# Patient Record
Sex: FEMALE | Race: OTHER | HISPANIC OR LATINO | URBAN - METROPOLITAN AREA
[De-identification: names, ages, dates, MRNs, and addresses within clinical notes are randomized per-mention and may not be internally consistent; named-entity substitution may affect disease eponyms.]

---

## 2024-02-03 ENCOUNTER — EMERGENCY (EMERGENCY)
Facility: HOSPITAL | Age: 44
LOS: 1 days | Discharge: HOME | End: 2024-02-03
Attending: PERSONAL EMERGENCY RESPONSE ATTENDANT
Payer: MEDICAID

## 2024-02-03 VITALS
WEIGHT: 160.06 LBS | DIASTOLIC BLOOD PRESSURE: 111 MMHG | OXYGEN SATURATION: 100 % | RESPIRATION RATE: 20 BRPM | SYSTOLIC BLOOD PRESSURE: 158 MMHG | HEART RATE: 65 BPM | HEIGHT: 55 IN | TEMPERATURE: 98 F

## 2024-02-03 DIAGNOSIS — Z98.891 HISTORY OF UTERINE SCAR FROM PREVIOUS SURGERY: Chronic | ICD-10-CM

## 2024-02-03 LAB
ALBUMIN SERPL ELPH-MCNC: 4.4 G/DL — SIGNIFICANT CHANGE UP (ref 3.3–5)
ALP SERPL-CCNC: 103 U/L — SIGNIFICANT CHANGE UP (ref 40–120)
ALT FLD-CCNC: 20 U/L — SIGNIFICANT CHANGE UP (ref 10–45)
ANION GAP SERPL CALC-SCNC: 12 MMOL/L — SIGNIFICANT CHANGE UP (ref 5–17)
AST SERPL-CCNC: 15 U/L — SIGNIFICANT CHANGE UP (ref 10–40)
BASE EXCESS BLDV CALC-SCNC: -0.4 MMOL/L — SIGNIFICANT CHANGE UP (ref -2–3)
BASOPHILS # BLD AUTO: 0.04 K/UL — SIGNIFICANT CHANGE UP (ref 0–0.2)
BASOPHILS NFR BLD AUTO: 0.5 % — SIGNIFICANT CHANGE UP (ref 0–2)
BILIRUB SERPL-MCNC: 0.2 MG/DL — SIGNIFICANT CHANGE UP (ref 0.2–1.2)
BUN SERPL-MCNC: 21 MG/DL — SIGNIFICANT CHANGE UP (ref 7–23)
CA-I SERPL-SCNC: 1.23 MMOL/L — SIGNIFICANT CHANGE UP (ref 1.15–1.33)
CALCIUM SERPL-MCNC: 9 MG/DL — SIGNIFICANT CHANGE UP (ref 8.4–10.5)
CHLORIDE BLDV-SCNC: 104 MMOL/L — SIGNIFICANT CHANGE UP (ref 96–108)
CHLORIDE SERPL-SCNC: 104 MMOL/L — SIGNIFICANT CHANGE UP (ref 96–108)
CO2 BLDV-SCNC: 27 MMOL/L — HIGH (ref 22–26)
CO2 SERPL-SCNC: 22 MMOL/L — SIGNIFICANT CHANGE UP (ref 22–31)
CREAT SERPL-MCNC: 0.71 MG/DL — SIGNIFICANT CHANGE UP (ref 0.5–1.3)
EGFR: 108 ML/MIN/1.73M2 — SIGNIFICANT CHANGE UP
EOSINOPHIL # BLD AUTO: 0.04 K/UL — SIGNIFICANT CHANGE UP (ref 0–0.5)
EOSINOPHIL NFR BLD AUTO: 0.5 % — SIGNIFICANT CHANGE UP (ref 0–6)
GAS PNL BLDV: 136 MMOL/L — SIGNIFICANT CHANGE UP (ref 136–145)
GAS PNL BLDV: SIGNIFICANT CHANGE UP
GLUCOSE BLDV-MCNC: 96 MG/DL — SIGNIFICANT CHANGE UP (ref 70–99)
GLUCOSE SERPL-MCNC: 98 MG/DL — SIGNIFICANT CHANGE UP (ref 70–99)
HCG SERPL-ACNC: <2 MIU/ML — SIGNIFICANT CHANGE UP
HCO3 BLDV-SCNC: 25 MMOL/L — SIGNIFICANT CHANGE UP (ref 22–29)
HCT VFR BLD CALC: 40.6 % — SIGNIFICANT CHANGE UP (ref 34.5–45)
HCT VFR BLDA CALC: 39 % — SIGNIFICANT CHANGE UP (ref 34.5–46.5)
HGB BLD CALC-MCNC: 13 G/DL — SIGNIFICANT CHANGE UP (ref 11.7–16.1)
HGB BLD-MCNC: 12.3 G/DL — SIGNIFICANT CHANGE UP (ref 11.5–15.5)
IMM GRANULOCYTES NFR BLD AUTO: 0.2 % — SIGNIFICANT CHANGE UP (ref 0–0.9)
LACTATE BLDV-MCNC: 0.7 MMOL/L — SIGNIFICANT CHANGE UP (ref 0.5–2)
LYMPHOCYTES # BLD AUTO: 2.08 K/UL — SIGNIFICANT CHANGE UP (ref 1–3.3)
LYMPHOCYTES # BLD AUTO: 25.5 % — SIGNIFICANT CHANGE UP (ref 13–44)
MAGNESIUM SERPL-MCNC: 2.3 MG/DL — SIGNIFICANT CHANGE UP (ref 1.6–2.6)
MCHC RBC-ENTMCNC: 23.3 PG — LOW (ref 27–34)
MCHC RBC-ENTMCNC: 30.3 GM/DL — LOW (ref 32–36)
MCV RBC AUTO: 76.9 FL — LOW (ref 80–100)
MONOCYTES # BLD AUTO: 0.44 K/UL — SIGNIFICANT CHANGE UP (ref 0–0.9)
MONOCYTES NFR BLD AUTO: 5.4 % — SIGNIFICANT CHANGE UP (ref 2–14)
NEUTROPHILS # BLD AUTO: 5.53 K/UL — SIGNIFICANT CHANGE UP (ref 1.8–7.4)
NEUTROPHILS NFR BLD AUTO: 67.9 % — SIGNIFICANT CHANGE UP (ref 43–77)
NRBC # BLD: 0 /100 WBCS — SIGNIFICANT CHANGE UP (ref 0–0)
NT-PROBNP SERPL-SCNC: 138 PG/ML — SIGNIFICANT CHANGE UP (ref 0–300)
PCO2 BLDV: 45 MMHG — HIGH (ref 39–42)
PH BLDV: 7.36 — SIGNIFICANT CHANGE UP (ref 7.32–7.43)
PLATELET # BLD AUTO: 210 K/UL — SIGNIFICANT CHANGE UP (ref 150–400)
PO2 BLDV: 22 MMHG — LOW (ref 25–45)
POTASSIUM BLDV-SCNC: 3.9 MMOL/L — SIGNIFICANT CHANGE UP (ref 3.5–5.1)
POTASSIUM SERPL-MCNC: 3.9 MMOL/L — SIGNIFICANT CHANGE UP (ref 3.5–5.3)
POTASSIUM SERPL-SCNC: 3.9 MMOL/L — SIGNIFICANT CHANGE UP (ref 3.5–5.3)
PROT SERPL-MCNC: 7.7 G/DL — SIGNIFICANT CHANGE UP (ref 6–8.3)
RBC # BLD: 5.28 M/UL — HIGH (ref 3.8–5.2)
RBC # FLD: 15 % — HIGH (ref 10.3–14.5)
SAO2 % BLDV: 30.3 % — LOW (ref 67–88)
SODIUM SERPL-SCNC: 138 MMOL/L — SIGNIFICANT CHANGE UP (ref 135–145)
TROPONIN T, HIGH SENSITIVITY RESULT: <6 NG/L — SIGNIFICANT CHANGE UP (ref 0–51)
TROPONIN T, HIGH SENSITIVITY RESULT: <6 NG/L — SIGNIFICANT CHANGE UP (ref 0–51)
WBC # BLD: 8.15 K/UL — SIGNIFICANT CHANGE UP (ref 3.8–10.5)
WBC # FLD AUTO: 8.15 K/UL — SIGNIFICANT CHANGE UP (ref 3.8–10.5)

## 2024-02-03 PROCEDURE — 99223 1ST HOSP IP/OBS HIGH 75: CPT

## 2024-02-03 PROCEDURE — 71045 X-RAY EXAM CHEST 1 VIEW: CPT | Mod: 26

## 2024-02-03 RX ORDER — ASPIRIN/CALCIUM CARB/MAGNESIUM 324 MG
81 TABLET ORAL DAILY
Refills: 0 | Status: DISCONTINUED | OUTPATIENT
Start: 2024-02-03 | End: 2024-02-07

## 2024-02-03 RX ADMIN — Medication 81 MILLIGRAM(S): at 20:17

## 2024-02-03 NOTE — ED CDU PROVIDER INITIAL DAY NOTE - ATTENDING APP SHARED VISIT CONTRIBUTION OF CARE
Attending MD Alvarez.  Agree with above.  PT is a well appearing, otherwise healthy 44 yo fem with no known hx of HTN/HLD/DM or CAD who presents to ED with complaint of R sided CP/SOB/sharp pain without clear exacerbating/relieving features.  NO LE edema.  No family hx of heart disease at a young age save pt's daughter who had congenital cardiac problem (patent ductus? req prostaglandins).  Pt endorses 2 wks intermittent CP.  Pt with EKG with concern for ST segment depressions in V3-V6 with mild elevation in AVR.  Does not meet STEMI criteria and repeat EKG's do not demonstrate evolution.  No previous EKG available for comparison.  Posterior EKG without STEMI indication.  Planned CDU stay for tele monitoring, echo, stress and cards doc of day.  PT has no current or personal cardiologist yet.  Pt well appearing.  No current CP.

## 2024-02-03 NOTE — ED PROVIDER NOTE - CLINICAL SUMMARY MEDICAL DECISION MAKING FREE TEXT BOX
43 y.o female nonsmoker w/ hx iron-deficiency anemia presents w/ intermittent non-positional, non-exertional right-sided sharp chest pain and pressure x 2 weeks, radiating to right arm and shoulder, associated with shortness of breath and lightheadedness. Improved w/ Tylenol. No famhx CAD. Pt hypertensive to 158/111, vital signs otherwise stable. Physical exam unremarkable. EKG significant for ST depression in precordial leads, T-wave inversions, new compared to previous EKG. Differential diagnosis includes ACS, bundle branch block, PE, CHF, PNA, GERD, cholecystitis, AAA. Given pt's symptoms and EKG changes, will work up for ACS.     Labs: CBC, CMP, Trop x 2, pro-BNP, Mg, VBG  XR Chest  Repeat EKGs    Dispo: Pending labs and imaging, if workup is negative and EKGs do not progress, likely d/c home w/ outpatient cardiology followup 43 y.o female nonsmoker w/ hx iron-deficiency anemia presents w/ intermittent non-positional, non-exertional right-sided sharp chest pain and pressure x 2 weeks, radiating to right arm and shoulder, associated with shortness of breath and lightheadedness. Improved w/ Tylenol. No famhx CAD. Pt hypertensive to 158/111, vital signs otherwise stable. Physical exam unremarkable. EKG significant for ST depression in precordial leads, T-wave inversions, new compared to previous EKG. Differential diagnosis includes ACS, bundle branch block, PE, CHF, PNA, GERD, cholecystitis, AAA. Given pt's symptoms and EKG changes, will work up for ACS.     Labs: CBC, CMP, Trop x 2, pro-BNP, Mg, VBG, HCG quant  XR Chest  Repeat EKGs    Dispo: Pending labs and imaging, if workup is negative and EKGs do not progress, likely d/c home w/ outpatient cardiology followup 43 y.o female nonsmoker w/ hx iron-deficiency anemia presents w/ intermittent non-positional, non-exertional right-sided sharp chest pain and pressure x 2 weeks, now resolved, radiating to right arm and shoulder, associated with shortness of breath and lightheadedness. Improved w/ Tylenol. Pt reports no pain x 3 days. No famhx CAD. Pt hypertensive to 158/111, vital signs otherwise stable. Physical exam unremarkable. EKG significant for ST depression in precordial leads, T-wave inversions, new compared to previous EKG. Differential diagnosis includes ACS, bundle branch block, PE, CHF, PNA, GERD, cholecystitis, AAA. Lower suspicion for ACS at this time as pt has been without pain for 3 days, however, given history and EKG changes, will workup to r/o ACS.    Labs: CBC, CMP, Trop, pro-BNP, Mg, VBG, HCG quant  XR Chest  Repeat EKGs    Dispo: Pending labs and imaging, if workup is negative and EKGs do not progress, likely d/c home w/ outpatient cardiology followup 43 y.o female nonsmoker w/ hx iron-deficiency anemia presents w/ intermittent non-positional, non-exertional right-sided sharp chest pain and pressure x 2 weeks, now resolved, radiating to right arm and shoulder, associated with shortness of breath and lightheadedness. Improved w/ Tylenol. Pt reports no pain x 3 days. No famhx CAD. Pt hypertensive to 158/111, vital signs otherwise stable. Physical exam unremarkable. EKG significant for ST depression in precordial leads, T-wave inversions, new compared to previous EKG. Differential diagnosis includes ACS, bundle branch block, PE, CHF, PNA, GERD, cholecystitis, AAA. Lower suspicion for ACS at this time as pt has been without pain for 3 days, however, given history and EKG changes, will workup to r/o ACS.    Labs: CBC, CMP, Trop, pro-BNP, Mg, VBG, HCG quant  XR Chest  Repeat EKGs    Dispo: Pending labs and imaging, if workup is negative and EKGs do not progress, likely offer overnight observation, then d/c home w/ outpatient cardiology followup 43 y.o female nonsmoker w/ hx iron-deficiency anemia presents w/ intermittent non-positional, non-exertional right-sided sharp chest pain and pressure x 2 weeks, now resolved, radiating to right arm and shoulder, associated with shortness of breath and lightheadedness. Improved w/ Tylenol. Pt reports no pain x 3 days. No famhx CAD. Pt hypertensive to 158/111, vital signs otherwise stable. Physical exam unremarkable. EKG significant for ST depression in precordial leads, T-wave inversions, new compared to previous EKG. Differential diagnosis includes ACS, bundle branch block, PE, CHF, PNA, GERD, cholecystitis, AAA. Lower suspicion for ACS at this time as pt has been without pain for 3 days, however, given history and EKG changes, will workup to r/o ACS.    Labs: CBC, CMP, Trop, pro-BNP, Mg, VBG, HCG quant  XR Chest  Repeat EKGs    Dispo: Pending labs and imaging, if workup is negative and EKGs do not progress, likely offer overnight observation, then d/c home w/ outpatient cardiology followup    Attending MD Alvarez.  Agree with above.  PT is a well appearing, otherwise healthy 42 yo fem with no known hx of HTN/HLD/DM or CAD who presents to ED with complaint of R sided CP/SOB/sharp pain without clear exacerbating/relieving features.  NO LE edema.  No family hx of heart disease at a young age save pt's daughter who had congenital cardiac problem (patent ductus? req prostaglandins).  Pt endorses 2 wks intermittent CP.  Pt with EKG with concern for ST segment depressions in V3-V6 with mild elevation in AVR.  Does not meet STEMI criteria and repeat EKG's do not demonstrate evolution.  No previous EKG available for comparison.  Posterior EKG without STEMI indication.  Planned CDU stay for tele monitoring, echo, stress and cards doc of day.  PT has no current or personal cardiologist yet.  Pt well appearing.  No current CP.

## 2024-02-03 NOTE — ED PROVIDER NOTE - PROGRESS NOTE DETAILS
Updated patient, labs wnl, troponin negative. Pt feels well and is currently without chest pain. Pt agreeable to CDU admission for overnight monitoring, echo, and cardiology consult. CDU PA made aware, is accepting the patient.

## 2024-02-03 NOTE — ED PROVIDER NOTE - OBJECTIVE STATEMENT
43 y.o female nonsmoker w/ hx iron-deficiency anemia presents w/ intermittent right-sided chest pain and pressure x 2 weeks. Pt reports pain presents suddenly, sometime w/ activity and sometimes at rest. Pt describes pain as sharp and pressure, lasting for minutes to hours, primarily over right side of chest, occasionally radiating to right arm and right shoulder, sometimes associated with SOB and lightheadedness. Not associated w/ N/V, syncope, confusion, leg swelling. Pain not exacerbated by exercise or eating. Pain improves w/ Tylenol. Last episode of chest pain was 3 days, pt currently denies any symptoms. No recent illness. Pt denies family hx CAD. No previous dx HTN. Pt is a HHA on a 4th floor walkup, walks up 4 flights of stairs w/o difficulty or pain.     Pt currently denies fever, chills, headache, dizziness, recent URI, N/V/D, abdominal pain, urinary symptoms, numbness/tingling of the extremities, or extremity weakness.

## 2024-02-03 NOTE — ED ADULT NURSE NOTE - OBJECTIVE STATEMENT
43 y.o F BIB self p/w c/o chest pressure. A+Ox4. Pt states about x2 wks ago started having sudden onset R sided chest pressure that radiates down the R arm. Non-exertional in nature. States last week everyday was feeling chest pressure, most recent episode was x3 days ago lasting about x3 hrs. Endorsing worsening pain at night associated w/ SOB. Reports taking tylenol for the pain w/ relief. Has appt w/ PCP for next fri. Upon initial assessment, LS clear B/L. No edema noted. Denies any current symptoms, SOB, urinary sx, bloody stools, f/c, n/v/d. PMH iron deficiency anemia. No other complaints at this time, safety maintained.

## 2024-02-03 NOTE — ED CDU PROVIDER INITIAL DAY NOTE - CLINICAL SUMMARY MEDICAL DECISION MAKING FREE TEXT BOX
Attending MD Alvarez.  Agree with above.  PT is a well appearing, otherwise healthy 42 yo fem with no known hx of HTN/HLD/DM or CAD who presents to ED with complaint of R sided CP/SOB/sharp pain without clear exacerbating/relieving features.  NO LE edema.  No family hx of heart disease at a young age save pt's daughter who had congenital cardiac problem (patent ductus? req prostaglandins).  Pt endorses 2 wks intermittent CP.  Pt with EKG with concern for ST segment depressions in V3-V6 with mild elevation in AVR.  Does not meet STEMI criteria and repeat EKG's do not demonstrate evolution.  No previous EKG available for comparison.  Posterior EKG without STEMI indication.  Planned CDU stay for tele monitoring, echo, stress and cards doc of day.  PT has no current or personal cardiologist yet.  Pt well appearing.  No current CP.

## 2024-02-03 NOTE — ED CDU PROVIDER INITIAL DAY NOTE - DETAILS
CHEST PAIN  -TELE  -[facility]  -[facility] EVAL  -Stressecho  -Cards to see   -CASE D/W ATTENDING

## 2024-02-04 VITALS
DIASTOLIC BLOOD PRESSURE: 78 MMHG | TEMPERATURE: 99 F | RESPIRATION RATE: 18 BRPM | HEART RATE: 76 BPM | OXYGEN SATURATION: 100 % | SYSTOLIC BLOOD PRESSURE: 118 MMHG

## 2024-02-04 LAB
A1C WITH ESTIMATED AVERAGE GLUCOSE RESULT: 5.5 % — SIGNIFICANT CHANGE UP (ref 4–5.6)
CHOLEST SERPL-MCNC: 181 MG/DL — SIGNIFICANT CHANGE UP
ESTIMATED AVERAGE GLUCOSE: 111 MG/DL — SIGNIFICANT CHANGE UP (ref 68–114)
HDLC SERPL-MCNC: 69 MG/DL — SIGNIFICANT CHANGE UP
LIPID PNL WITH DIRECT LDL SERPL: 101 MG/DL — HIGH
NON HDL CHOLESTEROL: 112 MG/DL — SIGNIFICANT CHANGE UP
TRIGL SERPL-MCNC: 59 MG/DL — SIGNIFICANT CHANGE UP

## 2024-02-04 PROCEDURE — 71045 X-RAY EXAM CHEST 1 VIEW: CPT

## 2024-02-04 PROCEDURE — 83880 ASSAY OF NATRIURETIC PEPTIDE: CPT

## 2024-02-04 PROCEDURE — 78452 HT MUSCLE IMAGE SPECT MULT: CPT | Mod: 26,MA

## 2024-02-04 PROCEDURE — 78452 HT MUSCLE IMAGE SPECT MULT: CPT | Mod: MA

## 2024-02-04 PROCEDURE — 80061 LIPID PANEL: CPT

## 2024-02-04 PROCEDURE — 83735 ASSAY OF MAGNESIUM: CPT

## 2024-02-04 PROCEDURE — 82803 BLOOD GASES ANY COMBINATION: CPT

## 2024-02-04 PROCEDURE — 99285 EMERGENCY DEPT VISIT HI MDM: CPT | Mod: 25

## 2024-02-04 PROCEDURE — 93017 CV STRESS TEST TRACING ONLY: CPT

## 2024-02-04 PROCEDURE — 80053 COMPREHEN METABOLIC PANEL: CPT

## 2024-02-04 PROCEDURE — 93016 CV STRESS TEST SUPVJ ONLY: CPT | Mod: MA

## 2024-02-04 PROCEDURE — 83036 HEMOGLOBIN GLYCOSYLATED A1C: CPT

## 2024-02-04 PROCEDURE — 99239 HOSP IP/OBS DSCHRG MGMT >30: CPT

## 2024-02-04 PROCEDURE — 84484 ASSAY OF TROPONIN QUANT: CPT

## 2024-02-04 PROCEDURE — 85018 HEMOGLOBIN: CPT

## 2024-02-04 PROCEDURE — 84132 ASSAY OF SERUM POTASSIUM: CPT

## 2024-02-04 PROCEDURE — 85014 HEMATOCRIT: CPT

## 2024-02-04 PROCEDURE — 84702 CHORIONIC GONADOTROPIN TEST: CPT

## 2024-02-04 PROCEDURE — 82435 ASSAY OF BLOOD CHLORIDE: CPT

## 2024-02-04 PROCEDURE — 85025 COMPLETE CBC W/AUTO DIFF WBC: CPT

## 2024-02-04 PROCEDURE — 93005 ELECTROCARDIOGRAM TRACING: CPT | Mod: 76,XU

## 2024-02-04 PROCEDURE — A9500: CPT

## 2024-02-04 PROCEDURE — 93018 CV STRESS TEST I&R ONLY: CPT | Mod: MA

## 2024-02-04 PROCEDURE — 82947 ASSAY GLUCOSE BLOOD QUANT: CPT

## 2024-02-04 PROCEDURE — 84295 ASSAY OF SERUM SODIUM: CPT

## 2024-02-04 PROCEDURE — G0378: CPT

## 2024-02-04 PROCEDURE — 82330 ASSAY OF CALCIUM: CPT

## 2024-02-04 PROCEDURE — 36415 COLL VENOUS BLD VENIPUNCTURE: CPT

## 2024-02-04 PROCEDURE — 83605 ASSAY OF LACTIC ACID: CPT

## 2024-02-04 NOTE — ED CDU PROVIDER DISPOSITION NOTE - ATTENDING APP SHARED VISIT CONTRIBUTION OF CARE
------------ATTENDING NOTE------------  remained asymptomatic, labs/imaging/stress wnl, benign repeat exams, cleared by cardiology (see attending note) for dc w/ close outpt fu.  - Bairon Maharaj MD   ---------------------------------------------

## 2024-02-04 NOTE — ED ADULT NURSE REASSESSMENT NOTE - NS ED NURSE REASSESS COMMENT FT1
Pt received from ZAYRA Han. Pt oriented to CDU & plan of care was discussed. Pt A&O x 4. Pt in CDU for tele monitoring and Nuclear Stress . Pt denies any chest pain, SOB, dizziness or palpitations at this time. V/S stable, pt afebrile,  IV in place, patent and free of signs of infiltration. Pt resting in bed. Safety & comfort measures maintained. Call bell in reach. Care continues.
07.00 Received the Pt from  ZAYRA Jonse Pt is Observed for CP for Nuclear stress test. Received the Pt A&OX 4  Pt obeys commands Nahed N/V/D fever chills cp SOB   Comfort care & safety measures continued  IV site looks clean & dry no signs of infiltration noted pt denies  pain IV site .Pt is oriented to the unit Plan of care explained .  Pt is advised to call for help  call bell with in the reach pt verbalized the understanding .  pending CDU  MD fernández . GCS 15/15 A&OX 4 PERRLA  size 3 Strong upper & lower extremities steady gait  Pt is ambulatory & independent   No facial droop  No Hand Leg drop denies numbness tingling  Plan of care ongoing

## 2024-02-04 NOTE — ED CDU PROVIDER DISPOSITION NOTE - CLINICAL COURSE
43 y.o female nonsmoker w/ hx iron-deficiency anemia presents w/ intermittent right-sided chest pain and pressure x 2 weeks. Pt reports pain presents suddenly, sometime w/ activity and sometimes at rest. Pt describes pain as sharp and pressure, lasting for minutes to hours, primarily over right side of chest, occasionally radiating to right arm and right shoulder, sometimes associated with SOB and lightheadedness. Not associated w/ N/V, syncope, confusion, leg swelling. Pain not exacerbated by exercise or eating. Pain improves w/ Tylenol. Last episode of chest pain was 3 days, pt currently denies any symptoms. No recent illness. Pt denies family hx CAD. No previous dx HTN. Pt is a HHA on a 4th floor walkup, walks up 4 flights of stairs w/o difficulty or pain.     Pt currently denies fever, chills, headache, dizziness, recent URI, N/V/D, abdominal pain, urinary symptoms, numbness/tingling of the extremities, or extremity weakness.    Labs unremarkable. sent to CDU for tele stress and cards c/s. Stress negative. cleared for discharge home per Dr. Maharaj

## 2024-02-04 NOTE — CONSULT NOTE ADULT - ASSESSMENT
EKG SR no ischemic changes     Stress pending     Assessment and Plan     1) CP :   - Atypical ,constant   - EKG on, trop -luz elena   - stress pending     2) Dizzness  - monitor on tele   - get echo if stress ok can be done as op     3) DVT PPX early ambulation

## 2024-02-04 NOTE — ED CDU PROVIDER SUBSEQUENT DAY NOTE - HISTORY
CDU PROGRESS NOTE GORDO ROJAS: No interval change. pt resting comfortably, NAD. VSS. denies CP/SOB at this time. pending exercise stress test in am. Will continue to monitor

## 2024-02-04 NOTE — CONSULT NOTE ADULT - SUBJECTIVE AND OBJECTIVE BOX
Giovanny Quinones MD  Interventional Cardiology / Endovascular Specialist  [place] Office : [address] [place] [address] [place] [address]  Tel:   [place] Office : [address] [facility] [place] [address]  Tel: [phone]    H&P  43 y.o female nonsmoker w/ hx iron-deficiency anemia presents w/ intermittent right-sided chest pain and pressure x 2 weeks. Pt reports pain presents suddenly, sometime w/ activity and sometimes at rest. Pt describes pain as sharp and pressure, lasting for minutes to hours,  primarily over right side of chest, occasionally radiating to right arm and right shoulder, sometimes associated with SOB and lightheadedness. Not associated w/ N/V, syncope, confusion, leg swelling. Pain not exacerbated by exercise or eating. Pain improves w/ Tylenol. Last  episode of chest pain was 3 days, pt currently denies any symptoms. No recent illness. Pt denies family hx CAD. No previous dx HTN. Pt is a HHA on a 4th floor walkup, walks up 4 flights of stairs w/o difficulty or pain.     Pt currently denies fever, chills, headache, dizziness, recent URI, N/V/D, abdominal pain, urinary symptoms, numbness/tingling of the extremities, or extremity weakness  	  MEDICATIONS:  aspirin enteric coated 81 milliGRAM(s) Oral daily                  PAST MEDICAL/SURGICAL HISTORY  PAST MEDICAL & SURGICAL HISTORY:  Iron deficiency anemia      H/O  section          SOCIAL HISTORY: Substance Use (street drugs): ( x ) never used  (  ) other:    FAMILY HISTORY:  No pertinent family history in first degree relatives        REVIEW OF SYSTEMS:  CONSTITUTIONAL: No fever, weight loss, or fatigue  EYES: No eye pain, visual disturbances, or discharge  ENMT:  No difficulty hearing, tinnitus, vertigo; No sinus or throat pain  BREASTS: No pain, masses, or nipple discharge  GASTROINTESTINAL: No abdominal or epigastric pain. No nausea, vomiting, or hematemesis; No diarrhea or constipation. No melena or hematochezia.  GENITOURINARY: No dysuria, frequency, hematuria, or incontinence  NEUROLOGICAL: No headaches, memory loss, loss of strength, numbness, or tremors  ENDOCRINE: No heat or cold intolerance; No hair loss  MUSCULOSKELETAL: No joint pain or swelling; No muscle, back, or extremity pain  PSYCHIATRIC: No depression, anxiety, mood swings, or difficulty sleeping  HEME/LYMPH: No easy bruising, or bleeding gums  All others negative    PHYSICAL EXAM:  T(C): 36.7 (24 @ 08:04), Max: 37.2 (24 @ 16:26)  HR: 63 (24 @ 08:04) (63 - 70)  BP: 124/84 (24 @ 08:04) (108/74 - 182/98)  RR: 18 (24 @ 08:04) (18 - 20)  SpO2: 99% (24 @ 08:04) (96% - 100%)  Wt(kg): --  I&O's Summary    Height (cm): 71.1 ( @ 16:15)  Weight (kg): 72.6 ( @ 16:15)  BMI (kg/m2): 143.6 ( @ 16:15)  BSA (m2): 0.98 ( @ 16:15)    GENERAL: NAD, well-groomed, well-developed  EYES: EOMI, PERRLA, conjunctiva and sclera clear  ENMT: No tonsillar erythema, exudates, or enlargement; Moist mucous membranes, Good dentition, No lesions  Cardiovascular: Normal S1 S2, No JVD, No murmurs, No edema  Respiratory: Lungs clear to auscultation	  Gastrointestinal:  Soft, Non-tender, + BS	  Extremities: Normal range of motion, No clubbing, cyanosis or edema  LYMPH: No lymphadenopathy noted  NERVOUS SYSTEM:  Alert & Oriented X3, Good concentration; Motor Strength 5/5 B/L upper and lower extremities; DTRs 2+ intact and symmetric                                    12.3   8.15  )-----------( 210      ( 2024 16:24 )             40.6         138  |  104  |  21  ----------------------------<  98  3.9   |  22  |  0.71    Ca    9.0      2024 16:24  Mg     2.3     -03    TPro  7.7  /  Alb  4.4  /  TBili  0.2  /  DBili  x   /  AST  15  /  ALT  20  /  AlkPhos  103  02    proBNP:   Lipid Profile:   HgA1c:   TSH:     Consultant(s) Notes Reviewed:  [x ] YES  [ ] NO    Care Discussed with Consultants/Other Providers [ x] YES  [ ] NO    Imaging Personally Reviewed independently:  [x] YES  [ ] NO    All labs, radiologic studies, vitals, orders and medications list reviewed. Patient is seen and examined at bedside. Case discussed with medical team.

## 2024-02-04 NOTE — ED CDU PROVIDER DISPOSITION NOTE - NSFOLLOWUPINSTRUCTIONS_ED_ALL_ED_FT
Follow up with your Primary Care Physician within the next 2-3 days  Bring a copy of your test results with you to your appointment  Continue your current medication regimen  Return to the Emergency Room if you experience new or worsening symptoms abdominal pain, nausea, vomiting, fever chills, cough, chest pain, shortness of breath, dizziness, slurred speech, weakness, gait abnormality  FOLLOW UP WITH DR LEAL CARDIOLOGY  [phone]  [address] [place] [id number]

## 2024-02-04 NOTE — ED CDU PROVIDER SUBSEQUENT DAY NOTE - PROGRESS NOTE DETAILS
GORDO Bennett: Patient seen at bedside in NAD.  VSS.  Patient resting comfortably without complaints. no events over tele. stress negative cleared for discharge home per ED attending Dr. Maharaj

## 2024-02-04 NOTE — ED CDU PROVIDER DISPOSITION NOTE - PATIENT PORTAL LINK FT
You can access the FollowMyHealth Patient Portal offered by [facility] by registering at the following website: [email or web address]. By joining [email or web address]’s FollowMyHealth portal, you will also be able to view your health information using other applications (apps) compatible with our system. General Sunscreen Counseling: I recommended a broad spectrum sunscreen with a SPF of 30 or higher.  I explained that SPF 30 sunscreens block approximately 97 percent of the sun's harmful rays.  Sunscreens should be applied at least 15 minutes prior to expected sun exposure and then every 2 hours after that as long as sun exposure continues. If swimming or exercising sunscreen should be reapplied every 45 minutes to an hour after getting wet or sweating.  One ounce, or the equivalent of a shot glass full of sunscreen, is adequate to protect the skin not covered by a bathing suit. I also recommended a lip balm with a sunscreen as well. Sun protective clothing can be used in lieu of sunscreen but must be worn the entire time you are exposed to the sun's rays. Detail Level: Detailed

## 2024-05-04 NOTE — ED ADULT NURSE NOTE - CAS EDP DISCH TYPE
Encounter Date: 5/4/2024       History     Chief Complaint   Patient presents with    Packing Change     Patient c/o right buttock abscess packing change    The history is provided by the patient.   Wound Check   She was treated in the ED 2 to 3 days ago. Previous treatment in the ED includes I&D of abscess. There has been clear discharge from the wound. There is no redness present. There is no swelling present. The pain has improved. She has no difficulty moving the affected extremity or digit.     Review of patient's allergies indicates:   Allergen Reactions    Cinnamon analogues Hives    Asa [aspirin] Itching     Allergic to large doses,  Can take baby aspirin     Past Medical History:   Diagnosis Date    Abnormal Pap smear of cervix age 25    dysplasia, no treatment    Alcohol abuse     Asthma     last exacerbation 15 years ago    Depression     x4 years    Diabetes mellitus     Fatty liver     Headache     HLD (hyperlipidemia)     Hx of psychiatric care     Hypertension     10 years    Neuropathy     Obesity     Panic disorder     Psychiatric problem     Sleep apnea     can not tolerate the coao machine    Therapy     Type 2 diabetes mellitus      Past Surgical History:   Procedure Laterality Date    ADENOIDECTOMY      CHOLECYSTECTOMY      over 10 years ago    COLONOSCOPY N/A 2/7/2019    Procedure: COLONOSCOPY;  Surgeon: Perez Tejeda MD;  Location: [facility];  Service: Endoscopy;  Laterality: N/A;    COLONOSCOPY N/A 3/7/2024    Procedure: COLONOSCOPY;  Surgeon: Samson Hoover MD;  Location: [facility] ENDO;  Service: Endoscopy;  Laterality: N/A;  Diabetic.    FOOT SURGERY      30 years growth removed non cancerous    Hiatus Hernia  10 years    HIP ASPIRATION AND DRAINAGE      HIP SURGERY      left    JOINT REPLACEMENT      Left total Hip replacement July 9th 2014    TONSILLECTOMY       Family History   Problem Relation Name Age of Onset    Diabetes Mother      Cancer Father      Breast cancer Neg Hx       Colon cancer Neg Hx      Ovarian cancer Neg Hx       Social History     Tobacco Use    Smoking status: Former     Current packs/day: 0.00     Types: Cigarettes     Quit date: 1996     Years since quittin.3    Smokeless tobacco: Never   Substance Use Topics    Alcohol use: Not Currently     Alcohol/week: 2.5 standard drinks of alcohol     Types: 3 Standard drinks or equivalent per week    Drug use: No     Review of Systems   Constitutional: Negative.    HENT: Negative.     Respiratory: Negative.     Cardiovascular: Negative.    Endocrine: Negative.    Genitourinary: Negative.    Musculoskeletal: Negative.    Skin:  Positive for wound.   Allergic/Immunologic: Negative.    Neurological: Negative.    Hematological: Negative.    Psychiatric/Behavioral: Negative.         Physical Exam     Initial Vitals [24 0954]   BP Pulse Resp Temp SpO2   133/63 78 18 98.4 °F (36.9 °C) 96 %      MAP       --         Physical Exam    Nursing note and vitals reviewed.  Constitutional: She appears well-developed and well-nourished.   Neck:   Normal range of motion.  Cardiovascular:  Normal rate.           Abdominal: Abdomen is soft.   Musculoskeletal:      Cervical back: Normal range of motion.           ED Course   Procedures  Labs Reviewed - No data to display       Imaging Results    None          Medications - No data to display  Medical Decision Making  Wound was irrigated and pack removed  no remaining undrained cavity                                      Clinical Impression:  Final diagnoses:  [K61.1] Perirectal abscess (Primary)          ED Disposition Condition    Discharge Stable          ED Prescriptions    None       Follow-up Information    None          DEE Street III, MD  24       DEE Street III, MD  24 [phone]     Home